# Patient Record
Sex: FEMALE | Race: WHITE | NOT HISPANIC OR LATINO | Employment: FULL TIME | URBAN - METROPOLITAN AREA
[De-identification: names, ages, dates, MRNs, and addresses within clinical notes are randomized per-mention and may not be internally consistent; named-entity substitution may affect disease eponyms.]

---

## 2021-05-25 ENCOUNTER — RECORDS - HEALTHEAST (OUTPATIENT)
Dept: ADMINISTRATIVE | Facility: CLINIC | Age: 21
End: 2021-05-25

## 2023-07-21 ENCOUNTER — OFFICE VISIT (OUTPATIENT)
Dept: CARDIOLOGY | Facility: CLINIC | Age: 23
End: 2023-07-21
Payer: COMMERCIAL

## 2023-07-21 VITALS
WEIGHT: 160.1 LBS | HEIGHT: 66 IN | HEART RATE: 81 BPM | BODY MASS INDEX: 25.73 KG/M2 | SYSTOLIC BLOOD PRESSURE: 113 MMHG | DIASTOLIC BLOOD PRESSURE: 74 MMHG

## 2023-07-21 DIAGNOSIS — R00.2 PALPITATIONS: ICD-10-CM

## 2023-07-21 DIAGNOSIS — R06.09 DYSPNEA ON EXERTION: ICD-10-CM

## 2023-07-21 DIAGNOSIS — I34.1 MITRAL VALVE PROLAPSE: ICD-10-CM

## 2023-07-21 DIAGNOSIS — I34.0 NONRHEUMATIC MITRAL VALVE REGURGITATION: ICD-10-CM

## 2023-07-21 DIAGNOSIS — I05.9 MITRAL VALVE DISEASE: Primary | ICD-10-CM

## 2023-07-21 DIAGNOSIS — R07.2 PRECORDIAL PAIN: ICD-10-CM

## 2023-07-21 PROCEDURE — 99213 OFFICE O/P EST LOW 20 MIN: CPT | Performed by: INTERNAL MEDICINE

## 2023-07-21 PROCEDURE — 93000 ELECTROCARDIOGRAM COMPLETE: CPT | Performed by: INTERNAL MEDICINE

## 2023-07-21 NOTE — PROGRESS NOTES
HPI and Plan:   Francia is a very nice 23-year-old woman who has had a diagnosis of mitral valve prolapse going back at least 10 years.  Her last echocardiogram done through [facility] describes an ejection fraction of 64% with trivial bileaflet mitral valve prolapse associated with trivial mitral regurgitation.  She now comes as she was told to follow-up again in 2 years.    She states she has occasional chest pain that it can occur both with rest and activity.  She has some palpitations and some dyspnea on exertion.  She states she is not worried about any of these features.    Both parents are alive and well.  Her father is adopted at 52 years of age without any heart problems.  His mother has no heart problems nor are there any in her family lineage.  Francia is a lifelong non-smoker occasionally drinks alcohol does not have diabetes mellitus or hypertension.  She does not know her cholesterol.    She states previous echocardiograms have described worse mitral regurgitation.  Her last one was her best.    Assessment and plan.  Francia has no symptoms to suggest ischemia, heart failure or significant arrhythmia.    She does have a heart murmur consistent with mitral regurgitation.  I will repeat her echocardiogram.  Further evaluation treatment depend upon the above results.Thank you for allow me to participate in this patient's care.  Sincerely,                               Herminio Burns MD [facility]      Today's clinic visit entailed:  Review of external notes as documented elsewhere in note  Ordering of each unique test  15 minutes spent by me on the date of the encounter doing chart review, history and exam, documentation and further activities per the note  Provider  Link to [facility] Help Grid           Orders Placed This Encounter   Procedures     EKG 12-lead complete w/read - Clinics (performed today)     Echocardiogram Complete       No orders of the defined types were placed in this encounter.      There  "are no discontinued medications.      Encounter Diagnoses   Name Primary?     Mitral valve disease Yes     Mitral valve prolapse      Nonrheumatic mitral valve regurgitation      Precordial pain      Palpitations      Dyspnea on exertion        CURRENT MEDICATIONS:  No current outpatient medications on file.       ALLERGIES   No Known Allergies    PAST MEDICAL HISTORY:  History reviewed. No pertinent past medical history.    PAST SURGICAL HISTORY:  History reviewed. No pertinent surgical history.    FAMILY HISTORY:  Family History   Problem Relation Age of Onset     No Known Problems Mother      No Known Problems Father        SOCIAL HISTORY:  Social History     Socioeconomic History     Marital status: Single     Spouse name: None     Number of children: None     Years of education: None     Highest education level: None   Tobacco Use     Smoking status: Never     Smokeless tobacco: Never   Substance and Sexual Activity     Alcohol use: Not Currently       Review of Systems:  Skin:          Eyes:         ENT:         Respiratory:  Positive for shortness of breath     Cardiovascular:    Positive for;chest pain;palpitations;lightheadedness;dizziness    Gastroenterology:        Genitourinary:         Musculoskeletal:         Neurologic:         Psychiatric:         Heme/Lymph/Imm:  Negative      Endocrine:  Negative        Physical Exam:  Vitals: /74   Pulse 81   Ht 1.664 m (5' 5.5\")   Wt 72.6 kg (160 lb 1.6 oz)   BMI 26.24 kg/m      Constitutional:  cooperative, alert and oriented, well developed, well nourished, in no acute distress        Skin:  warm and dry to the touch, no apparent skin lesions or masses noted          Head:  normocephalic, no masses or lesions        Eyes:  pupils equal and round, conjunctivae and lids unremarkable, sclera white, no xanthalasma, EOMS intact, no nystagmus        Lymph:      ENT:  no pallor or cyanosis, dentition good        Neck:  no carotid bruit        Respiratory:  " normal breath sounds, clear to auscultation, normal A-P diameter, normal symmetry, normal respiratory excursion, no use of accessory muscles         Cardiac: regular rhythm       grade 2;holosystolic murmur;apical        pulses full and equal                                        GI:           Extremities and Muscular Skeletal:  no edema;no spinal abnormalities noted;normal muscle strength and tone              Neurological:  no gross motor deficits        Psych:  affect appropriate, oriented to time, person and place        CC  No referring provider defined for this encounter.

## 2023-07-21 NOTE — LETTER
7/21/2023    Norah Santos MD  [id number]  [facility] [id number]    RE: Francia De La Garza       Dear Colleague,     I had the pleasure of seeing Francia De La Garza in the [facility].  HPI and Plan:   Francia is a very nice 23-year-old woman who has had a diagnosis of mitral valve prolapse going back at least 10 years.  Her last echocardiogram done through [facility] describes an ejection fraction of 64% with trivial bileaflet mitral valve prolapse associated with trivial mitral regurgitation.  She now comes as she was told to follow-up again in 2 years.    She states she has occasional chest pain that it can occur both with rest and activity.  She has some palpitations and some dyspnea on exertion.  She states she is not worried about any of these features.    Both parents are alive and well.  Her father is adopted at 52 years of age without any heart problems.  His mother has no heart problems nor are there any in her family lineage.  Francia is a lifelong non-smoker occasionally drinks alcohol does not have diabetes mellitus or hypertension.  She does not know her cholesterol.    She states previous echocardiograms have described worse mitral regurgitation.  Her last one was her best.    Assessment and plan.  Francia has no symptoms to suggest ischemia, heart failure or significant arrhythmia.    She does have a heart murmur consistent with mitral regurgitation.  I will repeat her echocardiogram.  Further evaluation treatment depend upon the above results.Thank you for allow me to participate in this patient's care.  Sincerely,                               Herminio Burns MD [facility]      Today's clinic visit entailed:  Review of external notes as documented elsewhere in note  Ordering of each unique test  15 minutes spent by me on the date of the encounter doing chart review, history and exam, documentation and further activities per the note  Provider  Link to [facility] Help Grid  "          Orders Placed This Encounter   Procedures    EKG 12-lead complete w/read - Clinics (performed today)    Echocardiogram Complete       No orders of the defined types were placed in this encounter.      There are no discontinued medications.      Encounter Diagnoses   Name Primary?    Mitral valve disease Yes    Mitral valve prolapse     Nonrheumatic mitral valve regurgitation     Precordial pain     Palpitations     Dyspnea on exertion        CURRENT MEDICATIONS:  No current outpatient medications on file.       ALLERGIES   No Known Allergies    PAST MEDICAL HISTORY:  History reviewed. No pertinent past medical history.    PAST SURGICAL HISTORY:  History reviewed. No pertinent surgical history.    FAMILY HISTORY:  Family History   Problem Relation Age of Onset    No Known Problems Mother     No Known Problems Father        SOCIAL HISTORY:  Social History     Socioeconomic History    Marital status: Single     Spouse name: None    Number of children: None    Years of education: None    Highest education level: None   Tobacco Use    Smoking status: Never    Smokeless tobacco: Never   Substance and Sexual Activity    Alcohol use: Not Currently       Review of Systems:  Skin:          Eyes:         ENT:         Respiratory:  Positive for shortness of breath     Cardiovascular:    Positive for;chest pain;palpitations;lightheadedness;dizziness    Gastroenterology:        Genitourinary:         Musculoskeletal:         Neurologic:         Psychiatric:         Heme/Lymph/Imm:  Negative      Endocrine:  Negative        Physical Exam:  Vitals: /74   Pulse 81   Ht 1.664 m (5' 5.5\")   Wt 72.6 kg (160 lb 1.6 oz)   BMI 26.24 kg/m      Constitutional:  cooperative, alert and oriented, well developed, well nourished, in no acute distress        Skin:  warm and dry to the touch, no apparent skin lesions or masses noted          Head:  normocephalic, no masses or lesions        Eyes:  pupils equal and round, " conjunctivae and lids unremarkable, sclera white, no xanthalasma, EOMS intact, no nystagmus        Lymph:      ENT:  no pallor or cyanosis, dentition good        Neck:  no carotid bruit        Respiratory:  normal breath sounds, clear to auscultation, normal A-P diameter, normal symmetry, normal respiratory excursion, no use of accessory muscles         Cardiac: regular rhythm       grade 2;holosystolic murmur;apical        pulses full and equal                                        GI:           Extremities and Muscular Skeletal:  no edema;no spinal abnormalities noted;normal muscle strength and tone              Neurological:  no gross motor deficits        Psych:  affect appropriate, oriented to time, person and place        CC  No referring provider defined for this encounter.      Thank you for allowing me to participate in the care of your patient.      Sincerely,     Herminio Burns MD     [facility]

## 2023-08-04 ENCOUNTER — HOSPITAL ENCOUNTER (OUTPATIENT)
Dept: CARDIOLOGY | Facility: CLINIC | Age: 23
Discharge: HOME | End: 2023-08-04
Attending: INTERNAL MEDICINE | Admitting: INTERNAL MEDICINE
Payer: COMMERCIAL

## 2023-08-04 ENCOUNTER — TELEPHONE (OUTPATIENT)
Dept: CARDIOLOGY | Facility: CLINIC | Age: 23
End: 2023-08-04

## 2023-08-04 DIAGNOSIS — I34.1 MITRAL VALVE PROLAPSE: ICD-10-CM

## 2023-08-04 LAB — LVEF ECHO: NORMAL

## 2023-08-04 PROCEDURE — 93306 TTE W/DOPPLER COMPLETE: CPT

## 2023-08-04 PROCEDURE — 93306 TTE W/DOPPLER COMPLETE: CPT | Mod: 26 | Performed by: INTERNAL MEDICINE

## 2023-08-04 NOTE — TELEPHONE ENCOUNTER
Echo completed as below, ordered at visit 7/21/23 by Dr Burns for assessment of heart murmur. Hx MVP. Routed to provider to review.     1. Left ventricular systolic function is normal. The visual ejection fraction is 60-65%.  2. No regional wall motion abnormalities noted.  3. The right ventricle is normal in structure, function and size.  4. Mild bileaflet mitral valve prolapse. There is trace mitral regurgitation.  [place]: Dr Monroe

## 2023-08-07 NOTE — TELEPHONE ENCOUNTER
Herminio Burns MD  You3 days ago     Looks good.  Repeat in 5 years.       Spoke to patient, reviewed echo and message from Dr Burns. She verbalized understanding, no questions at this time.

## 2024-02-13 ENCOUNTER — OFFICE VISIT (OUTPATIENT)
Dept: URGENT CARE | Facility: URGENT CARE | Age: 24
End: 2024-02-13

## 2024-02-13 ENCOUNTER — ANCILLARY PROCEDURE (OUTPATIENT)
Dept: GENERAL RADIOLOGY | Facility: CLINIC | Age: 24
End: 2024-02-13
Attending: FAMILY MEDICINE
Payer: COMMERCIAL

## 2024-02-13 VITALS
RESPIRATION RATE: 18 BRPM | TEMPERATURE: 97.8 F | BODY MASS INDEX: 24.58 KG/M2 | HEART RATE: 74 BPM | OXYGEN SATURATION: 98 % | WEIGHT: 150 LBS | SYSTOLIC BLOOD PRESSURE: 110 MMHG | DIASTOLIC BLOOD PRESSURE: 77 MMHG
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
## 2024-02-13 DIAGNOSIS — S19.9XXA NECK INJURY, INITIAL ENCOUNTER: Primary | ICD-10-CM

## 2024-02-13 PROCEDURE — 72040 X-RAY EXAM NECK SPINE 2-3 VW: CPT | Mod: TC | Performed by: RADIOLOGY

## 2024-02-13 PROCEDURE — 99204 OFFICE O/P NEW MOD 45 MIN: CPT | Performed by: FAMILY MEDICINE

## 2024-02-13 RX ORDER — METHOCARBAMOL 750 MG/1
750 TABLET, FILM COATED ORAL 4 TIMES DAILY
Qty: 28 TABLET | Refills: 0 | Status: SHIPPED | OUTPATIENT
Start: 2024-02-13 | End: 2024-02-20
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
